# Patient Record
Sex: MALE
[De-identification: names, ages, dates, MRNs, and addresses within clinical notes are randomized per-mention and may not be internally consistent; named-entity substitution may affect disease eponyms.]

---

## 2018-05-18 ENCOUNTER — HOSPITAL ENCOUNTER (OUTPATIENT)
Dept: HOSPITAL 88 - CT | Age: 53
End: 2018-05-18
Attending: UROLOGY
Payer: COMMERCIAL

## 2018-05-18 DIAGNOSIS — N20.0: Primary | ICD-10-CM

## 2018-05-18 PROCEDURE — 74176 CT ABD & PELVIS W/O CONTRAST: CPT

## 2018-05-18 NOTE — DIAGNOSTIC IMAGING REPORT
PROCEDURE: CT ABDOMEN AND PELVIS WITHOUT CONTRAST

 

TECHNIQUE: 

The abdomen and pelvis were scanned utilizing a multidetector helical 

scanner from the diaphragm to the lesser trochanter after the oral 

administration of water. No IV contrast was administered per protocol. 

Coronal and sagittal multiplanar reformations were obtained.

 

COMPARISON:   Patients [facility], CT, CT ABDOMEN/PELVIS WO, 

12/12/2011, 16:29.  Patients [facility], CT, CT ABDOMEN/PELVIS W, 

10/27/2014, 17:50.

 

INDICATIONS:   CALCULUS OF KIDNEY

 

FINDINGS:

 

ABSENCE OF INTRAVENOUS CONTRAST DECREASES SENSITIVITY FOR DETECTION OF 

FOCAL LESIONS AND VASCULAR PATHOLOGY.

 

LOWER THORAX: Lung bases are grossly clear. Atherosclerotic 

calcification of the coronary arteries.

 

HEPATOBILIARY: Marked diffuse hepatic steatosis. No focal lesions. No 

biliary ductal dilation. The gallbladder is unremarkable.

SPLEEN: No splenomegaly.

PANCREAS: No focal masses or ductal dilatation.

 

ADRENALS: No adrenal nodules.

KIDNEYS/URETERS: No renal, ureteral, or bladder calculi. No 

hydronephrosis or obstruction. No renal contour abnormalities or 

significant perinephric stranding.

PELVIC ORGANS/BLADDER: Bladder and prostate are unremarkable.

 

PERITONEUM / RETROPERITONEUM: No free air or fluid.

LYMPH NODES: Mildly enlarged right external iliac lymph node (series 3, 

image 150), which measures 1.1 cm in short axis, decreased from 

previous.

Mildly enlarged left external iliac lymph node which measures 1.1 cm in 

short axis (series 3, image 145), stable.

No other adenopathy. 

VESSELS: IVC filter in place.

 Extensive venous collaterals are again seen in the abdominal wall.

GI TRACT: No bowel dilation or evidence of obstruction. No pericolonic 

inflammatory changes.

Marked degenerative disc changes L5-S1 with grade 1 anterolisthesis of 

L5 on S1 secondary to bilateral pars interarticularis defect. High 

density material in the right lateral aspect of L5 likely reflects 

vertebroplasty.

 

IMPRESSION:

 

1. no renal, ureteral, or bladder calculi. No hydronephrosis or 

obstruction.

2. Marked diffuse hepatic steatosis. No focal lesions.

3. Mildly enlarged right external iliac lymph node, which is decreased 

since prior exam. Mildly enlarged left external iliac lymph node, which 

is stable. No other adenopathy.

 

 

 

Santos Milligan M.D.  

Dictated by:  Santos Milligan M.D. on 5/18/2018 at 14:53     

Electronically approved by:  Santos Milligan M.D. on 5/18/2018 at 

14:53

## 2019-06-11 ENCOUNTER — HOSPITAL ENCOUNTER (OUTPATIENT)
Dept: HOSPITAL 88 - RAD | Age: 54
End: 2019-06-11
Attending: UROLOGY
Payer: COMMERCIAL

## 2019-06-11 DIAGNOSIS — Z87.442: ICD-10-CM

## 2019-06-11 DIAGNOSIS — M25.561: ICD-10-CM

## 2019-06-11 DIAGNOSIS — M25.562: Primary | ICD-10-CM

## 2019-06-11 PROCEDURE — 74018 RADEX ABDOMEN 1 VIEW: CPT

## 2019-06-11 NOTE — DIAGNOSTIC IMAGING REPORT
Exam: KUB - 3 views



Clinical History: Renal calculus.



Comparison: CT abdomen/pelvis 5/18/2018.



Findings: 

Bowel gas partially obscures visualization of the kidneys. No evidence of

nephrolithiasis. 



Calcification overlying the right L5 vertebral body corresponds to vertebral

augmentation changes. No acute osseous abnormality.



There is an IVC filter overlying the L3 vertebral body.



Impression:

Bowel gas partially obscures visualization of the kidneys. No evidence of

nephrolithiasis. 



Signed by: Dr. Ananya Orellana MD on 6/11/2019 3:27 PM

## 2019-06-11 NOTE — DIAGNOSTIC IMAGING REPORT
Exam: Right knee radiographs-3 views; left knee radiographs-3 views



History: Bilateral knee pain.



Comparison: None.



Findings:  

No evidence of acute fracture, malalignment, or soft tissue mildly. No

suprapatellar joint effusion. Minimal bilateral medial compartment degenerative

changes



Impression:

No acute radiographic abnormality. Minimal bilateral knee medial compartment

degenerative changes.



Signed by: Dr. Ananya Orellana MD on 6/11/2019 12:59 PM

## 2019-08-02 ENCOUNTER — HOSPITAL ENCOUNTER (OUTPATIENT)
Dept: HOSPITAL 88 - US | Age: 54
End: 2019-08-02
Attending: UROLOGY
Payer: COMMERCIAL

## 2019-08-02 DIAGNOSIS — N50.811: ICD-10-CM

## 2019-08-02 DIAGNOSIS — N43.3: ICD-10-CM

## 2019-08-02 DIAGNOSIS — C62.90: Primary | ICD-10-CM

## 2019-08-02 PROCEDURE — 76870 US EXAM SCROTUM: CPT

## 2019-08-02 PROCEDURE — 93976 VASCULAR STUDY: CPT

## 2019-08-02 NOTE — DIAGNOSTIC IMAGING REPORT
Exam: Testicular ultrasound.



Clinical History:  Right testicular pain



Technique: Grayscale and color Doppler and waveform evaluation of the testes.



Findings: 



Status post left orchiectomy.



The right testicle measures 3.1 x 1.4 x 2.2 cm and demonstrates slightly

heterogeneous echogenicity. Normal blood flow and waveform. 1 mm and 2 mm

punctate calcifications in the superior testicle. The right epididymis measures

1.0 x 0.8 x 0.8 cm and appears normal. Trace right hydrocele. No varicocele.



Bilateral nonenlarged inguinal lymph nodes.



Impression:

Status post left orchiectomy.



Slightly heterogeneous echogenicity of right testicle with normal blood flow.

Trace right hydrocele.



Signed by: Brian Payne MD on 8/2/2019 5:32 PM

## 2019-08-23 ENCOUNTER — HOSPITAL ENCOUNTER (INPATIENT)
Dept: HOSPITAL 88 - ER | Age: 54
LOS: 5 days | Discharge: HOME | DRG: 813 | End: 2019-08-28
Attending: INTERNAL MEDICINE | Admitting: INTERNAL MEDICINE
Payer: COMMERCIAL

## 2019-08-23 VITALS — SYSTOLIC BLOOD PRESSURE: 151 MMHG | DIASTOLIC BLOOD PRESSURE: 79 MMHG

## 2019-08-23 VITALS — DIASTOLIC BLOOD PRESSURE: 79 MMHG | SYSTOLIC BLOOD PRESSURE: 151 MMHG

## 2019-08-23 VITALS — WEIGHT: 315 LBS | HEIGHT: 70 IN | BODY MASS INDEX: 45.1 KG/M2

## 2019-08-23 DIAGNOSIS — R65.10: ICD-10-CM

## 2019-08-23 DIAGNOSIS — G47.33: ICD-10-CM

## 2019-08-23 DIAGNOSIS — Z86.718: ICD-10-CM

## 2019-08-23 DIAGNOSIS — E66.01: ICD-10-CM

## 2019-08-23 DIAGNOSIS — Z95.828: ICD-10-CM

## 2019-08-23 DIAGNOSIS — D68.9: Primary | ICD-10-CM

## 2019-08-23 DIAGNOSIS — Z85.47: ICD-10-CM

## 2019-08-23 DIAGNOSIS — R77.1: ICD-10-CM

## 2019-08-23 DIAGNOSIS — N40.0: ICD-10-CM

## 2019-08-23 DIAGNOSIS — E11.9: ICD-10-CM

## 2019-08-23 DIAGNOSIS — Z79.01: ICD-10-CM

## 2019-08-23 DIAGNOSIS — E88.09: ICD-10-CM

## 2019-08-23 DIAGNOSIS — M06.9: ICD-10-CM

## 2019-08-23 LAB
ALBUMIN SERPL-MCNC: 3.7 G/DL (ref 3.5–5)
ALBUMIN/GLOB SERPL: 0.9 {RATIO} (ref 0.8–2)
ALP SERPL-CCNC: 73 IU/L (ref 40–150)
ALT SERPL-CCNC: 49 IU/L (ref 0–55)
ANION GAP SERPL CALC-SCNC: 18.1 MMOL/L (ref 8–16)
BACTERIA URNS QL MICRO: (no result) /HPF
BASOPHILS # BLD AUTO: 0 10*3/UL (ref 0–0.1)
BASOPHILS NFR BLD AUTO: 0.2 % (ref 0–1)
BILIRUB UR QL: NEGATIVE
BUN SERPL-MCNC: 20 MG/DL (ref 7–26)
BUN/CREAT SERPL: 21 (ref 6–25)
CALCIUM SERPL-MCNC: 10 MG/DL (ref 8.4–10.2)
CHLORIDE SERPL-SCNC: 100 MMOL/L (ref 98–107)
CK MB SERPL-MCNC: 0.8 NG/ML (ref 0–5)
CK MB SERPL-MCNC: 1.1 NG/ML (ref 0–5)
CK SERPL-CCNC: 60 IU/L (ref 30–200)
CK SERPL-CCNC: 71 IU/L (ref 30–200)
CLARITY UR: CLEAR
CO2 SERPL-SCNC: 23 MMOL/L (ref 22–29)
COLOR UR: YELLOW
DEPRECATED APTT PLAS QN: 33.9 SECONDS (ref 23.8–35.5)
DEPRECATED INR PLAS: 1.83
DEPRECATED NEUTROPHILS # BLD AUTO: 14.5 10*3/UL (ref 2.1–6.9)
DEPRECATED RBC URNS MANUAL-ACNC: (no result) /HPF (ref 0–5)
EGFRCR SERPLBLD CKD-EPI 2021: > 60 ML/MIN (ref 60–?)
EOSINOPHIL # BLD AUTO: 0.1 10*3/UL (ref 0–0.4)
EOSINOPHIL NFR BLD AUTO: 0.6 % (ref 0–6)
EPI CELLS URNS QL MICRO: (no result) /LPF
ERYTHROCYTE [DISTWIDTH] IN CORD BLOOD: 15.3 % (ref 11.7–14.4)
GLOBULIN PLAS-MCNC: 4.3 G/DL (ref 2.3–3.5)
GLUCOSE SERPLBLD-MCNC: 185 MG/DL (ref 74–118)
HCT VFR BLD AUTO: 47.8 % (ref 38.2–49.6)
HGB BLD-MCNC: 15.9 G/DL (ref 14–18)
KETONES UR QL STRIP.AUTO: NEGATIVE
LEUKOCYTE ESTERASE UR QL STRIP.AUTO: NEGATIVE
LYMPHOCYTES # BLD: 0.9 10*3/UL (ref 1–3.2)
LYMPHOCYTES NFR BLD AUTO: 5.2 % (ref 18–39.1)
MCH RBC QN AUTO: 32.2 PG (ref 28–32)
MCHC RBC AUTO-ENTMCNC: 33.3 G/DL (ref 31–35)
MCV RBC AUTO: 96.8 FL (ref 81–99)
MONOCYTES # BLD AUTO: 1 10*3/UL (ref 0.2–0.8)
MONOCYTES NFR BLD AUTO: 5.9 % (ref 4.4–11.3)
NEUTS SEG NFR BLD AUTO: 87.5 % (ref 38.7–80)
NITRITE UR QL STRIP.AUTO: NEGATIVE
PLATELET # BLD AUTO: 201 X10E3/UL (ref 140–360)
POTASSIUM SERPL-SCNC: 4.1 MMOL/L (ref 3.5–5.1)
PROT UR QL STRIP.AUTO: NEGATIVE
PROTHROMBIN TIME: 21.8 SECONDS (ref 11.9–14.5)
RBC # BLD AUTO: 4.94 X10E6/UL (ref 4.3–5.7)
SODIUM SERPL-SCNC: 137 MMOL/L (ref 136–145)
SP GR UR STRIP: 1.01 (ref 1.01–1.02)
UROBILINOGEN UR STRIP-MCNC: 0.2 MG/DL (ref 0.2–1)
WBC #/AREA URNS HPF: (no result) /HPF (ref 0–5)

## 2019-08-23 PROCEDURE — 83036 HEMOGLOBIN GLYCOSYLATED A1C: CPT

## 2019-08-23 PROCEDURE — 80048 BASIC METABOLIC PNL TOTAL CA: CPT

## 2019-08-23 PROCEDURE — 83605 ASSAY OF LACTIC ACID: CPT

## 2019-08-23 PROCEDURE — 87086 URINE CULTURE/COLONY COUNT: CPT

## 2019-08-23 PROCEDURE — 71045 X-RAY EXAM CHEST 1 VIEW: CPT

## 2019-08-23 PROCEDURE — 85610 PROTHROMBIN TIME: CPT

## 2019-08-23 PROCEDURE — 85025 COMPLETE CBC W/AUTO DIFF WBC: CPT

## 2019-08-23 PROCEDURE — 84439 ASSAY OF FREE THYROXINE: CPT

## 2019-08-23 PROCEDURE — 93306 TTE W/DOPPLER COMPLETE: CPT

## 2019-08-23 PROCEDURE — 82948 REAGENT STRIP/BLOOD GLUCOSE: CPT

## 2019-08-23 PROCEDURE — 85379 FIBRIN DEGRADATION QUANT: CPT

## 2019-08-23 PROCEDURE — 93970 EXTREMITY STUDY: CPT

## 2019-08-23 PROCEDURE — 83880 ASSAY OF NATRIURETIC PEPTIDE: CPT

## 2019-08-23 PROCEDURE — 80061 LIPID PANEL: CPT

## 2019-08-23 PROCEDURE — 83735 ASSAY OF MAGNESIUM: CPT

## 2019-08-23 PROCEDURE — 84443 ASSAY THYROID STIM HORMONE: CPT

## 2019-08-23 PROCEDURE — 87040 BLOOD CULTURE FOR BACTERIA: CPT

## 2019-08-23 PROCEDURE — 87070 CULTURE OTHR SPECIMN AEROBIC: CPT

## 2019-08-23 PROCEDURE — 82553 CREATINE MB FRACTION: CPT

## 2019-08-23 PROCEDURE — 80053 COMPREHEN METABOLIC PANEL: CPT

## 2019-08-23 PROCEDURE — 36415 COLL VENOUS BLD VENIPUNCTURE: CPT

## 2019-08-23 PROCEDURE — 82550 ASSAY OF CK (CPK): CPT

## 2019-08-23 PROCEDURE — 81001 URINALYSIS AUTO W/SCOPE: CPT

## 2019-08-23 PROCEDURE — 93005 ELECTROCARDIOGRAM TRACING: CPT

## 2019-08-23 PROCEDURE — 99284 EMERGENCY DEPT VISIT MOD MDM: CPT

## 2019-08-23 PROCEDURE — 85730 THROMBOPLASTIN TIME PARTIAL: CPT

## 2019-08-23 PROCEDURE — 86140 C-REACTIVE PROTEIN: CPT

## 2019-08-23 PROCEDURE — 85651 RBC SED RATE NONAUTOMATED: CPT

## 2019-08-23 PROCEDURE — 84484 ASSAY OF TROPONIN QUANT: CPT

## 2019-08-23 PROCEDURE — 83518 IMMUNOASSAY DIPSTICK: CPT

## 2019-08-23 PROCEDURE — 94660 CPAP INITIATION&MGMT: CPT

## 2019-08-23 RX ADMIN — Medication PRN MG: at 21:31

## 2019-08-23 RX ADMIN — ENOXAPARIN SODIUM SCH MG: 100 INJECTION SUBCUTANEOUS at 18:24

## 2019-08-23 RX ADMIN — SODIUM CHLORIDE SCH MLS/HR: 9 INJECTION, SOLUTION INTRAVENOUS at 18:24

## 2019-08-23 NOTE — XMS REPORT
Patient Summary Document

                             Created on: 2019



CHEMA FISHER

External Reference #: [id number]

: 1965

Sex: Male



Demographics







                          Address                   [address] [place] [address]  [address]

 

                          Home Phone                [phone]

 

                          Preferred Language        Unknown

 

                          Marital Status            Unknown

 

                          Sabianist Affiliation     Unknown

 

                          Race                      Unknown

 

                                        Additional Race(s)  

 

                          Ethnic Group              Unknown





Author







                          [facility]                    [facility]

 

                          [facility]

 

                          Address                   Unknown

 

                          Phone                     Unavailable







Care Team Providers







                    Care Team Member Name    Role                Phone

 

                    CHARLES WILHELM        Unavailable         Unavailable







Problems

This patient has no known problems.



Allergies, Adverse Reactions, Alerts

This patient has no known allergies or adverse reactions.



Medications

This patient has no known medications.



Results







           Test Description    Test Time    Test Comments    Text Results    Atomic Results    Result

 Comments

 

                 TESTICULAR    2019 17:28:00                                                             

                                             [place] [address]  
   Patient Name: CHEMA FISHER                                   MR #: 
[id number]                     : 1965                                  
Age/Sex: 54/M  Acct #: [id number]                              Req #: [id number]
[id number]  [facility] Physician:                                                      
Ordered by: CHARLES WILHELM MD                            Report #: [id number]       
Location:                                       Room/Bed:                     
___________________________________________________________________________________________________
   Procedure: [id number] US/US TESTICULAR  Exam Date: 19                   
        Exam Time: 1638                                              REPORT 
STATUS: Signed    Exam: Testicular ultrasound.      Clinical History:  Right 
testicular pain      Technique: Grayscale and color Doppler and waveform 
evaluation of the testes.      Findings:       Status post left orchiectomy.    
 The right testicle measures 3.1 x 1.4 x 2.2 cm and demonstrates slightly   
heterogeneous echogenicity. Normal blood flow and waveform. 1 mm and 2 mm   
punctate calcifications in the superior testicle. The right epididymis measures 
 1.0 x 0.8 x 0.8 cm and appears normal. Trace right hydrocele. No varicocele.   
  Bilateral nonenlarged inguinal lymph nodes.      Impression:   Status post 
left orchiectomy.      Slightly heterogeneous echogenicity of right testicle 
with normal blood flow.   Trace right hydrocele.      Signed by: Natty Infante MD 
on 2019 5:32 PM        Dictated By: NATTY INFANTE MD  Electronically Signed By:
NATTY INFANTE MD on 19  Transcribed By: MARTIR on 19       
COPY TO:   CHARLES WILHELM MD                 

 

                US TESTICULAR DOPPLER LTD    2019 17:28:00                                                 

                                                         [place] [address]      Patient Name: CHEMA FISHER                        
          MR #: [id number]                     : 1965                  
                Age/Sex: 54/M  Acct #: [id number]                             
Req #: [id number]  Adm Physician:                                               
      Ordered by: CHARLES WILHELM MD                            Report #: [id number] 
      Location:                                       Room/Bed:               
     
___________________________________________________________________________________________________
   Procedure: [id number] US/US TESTICULAR DOPPLER LTD  Exam Date: 19       
                    Exam Time: 1638                                             
REPORT STATUS: Signed    Exam: Testicular ultrasound.      Clinical History:  
Right testicular pain      Technique: Grayscale and color Doppler and waveform 
evaluation of the testes.      Findings:       Status post left orchiectomy.    
 The right testicle measures 3.1 x 1.4 x 2.2 cm and demonstrates slightly   
heterogeneous echogenicity. Normal blood flow and waveform. 1 mm and 2 mm   
punctate calcifications in the superior testicle. The right epididymis measures 
 1.0 x 0.8 x 0.8 cm and appears normal. Trace right hydrocele. No varicocele.   
  Bilateral nonenlarged inguinal lymph nodes.      Impression:   Status post 
left orchiectomy.      Slightly heterogeneous echogenicity of right testicle 
with normal blood flow.   Trace right hydrocele.      Signed by: Natty Infante MD 
on 2019 5:32 PM        Dictated By: NATTY INFANTE MD  Electronically Signed By:
NATTY INFANTE MD on 19  Transcribed By: MARTIR on 19       
COPY TO:   CHARLES WILHELM MD                 

 

                ABDOMEN-1VIEW (KUB)    2019 15:17:00                                                       

                                                   [place] [address]      Patient Name: CHEMA FISHER                                  
MR #: [id number]                     : 1965                            
      Age/Sex: 54/M  Acct #: [id number]                              Req #: 
[id number]  Adm Physician:                                                      
Ordered by: CHARLES WILHELM MD                            Report #: [id number]       
Location: [facility]                                     Room/Bed:                     
___________________________________________________________________________________________________
   Procedure: [id number] DX/ABDOMEN-1VIEW (KUB)  Exam Date: 19             
              Exam Time: 1458                                              
REPORT STATUS: Signed    Exam: KUB - 3 views      Clinical History: Renal 
calculus.      Comparison: CT abdomen/pelvis 2018.      Findings:    Bowel 
gas partially obscures visualization of the kidneys. No evidence of   
nephrolithiasis.       Calcification overlying the right L5 vertebral body 
corresponds to vertebral   augmentation changes. No acute osseous abnormality.  
   There is an IVC filter overlying the L3 vertebral body.      Impression:   
Bowel gas partially obscures visualization of the kidneys. No evidence of   
nephrolithiasis.       Signed by: Dr. Dao Maldonado MD on 2019 3:27 PM       
Dictated By: DAO MALDONADO MD  Electronically Signed By: DAO MALDONADO MD on 19 
1527  Transcribed By: MARTIR on 19 152       COPY TO:   CHARLES WILHELM MD   
                                         

 

                KNEE THREE VIEWS BILATERAL    2019 12:56:00                                                

                                                          [place] [address] [place], [place] [address]      Patient Name: CHEMA FISHER                        
          MR #: [id number]                     : 1965                  
                Age/Sex: 54/M  Acct #: [id number]                             
Req #: [id number]  Adm Physician:                                               
      Ordered by: OLIVIA HAINES                             Report #: [id number]
[id number]        Location: RAD                                     Room/Bed:         
           
___________________________________________________________________________________________________
   Procedure: [id number] DX/KNEE THREE VIEWS BILATERAL  Exam Date: 19      
                     Exam Time: 1228                                            
 REPORT STATUS: Signed       Exam: Right knee radiographs-3 views; left knee 
radiographs-3 views      History: Bilateral knee pain.      Comparison: None.   
  Findings:     No evidence of acute fracture, malalignment, or soft tissue 
mildly. No   suprapatellar joint effusion. Minimal bilateral medial compartment 
degenerative   changes      Impression:   No acute radiographic abnormality. 
Minimal bilateral knee medial compartment   degenerative changes.      Signed 
by: Dr. Dao Maldonado MD on 2019 12:59 PM        Dictated By: DAO MALDONADO MD  
Electronically Signed By: DAO MALDONADO MD on 19 1259  Transcribed By: 
MARTIR on 19 1259       COPY TO:   OLIVIA HAINES (NON STAFF)              

 

                CT ABDOMEN/PELVIS WO                                        [address] [address]      Patient Name: CHEMA FISHER 
 MR #: [id number]    : 1965 Age/Sex: 53/M  Acct #: [id number] Req #: 
[id number]  Adm Physician:     Ordered by: CHARLES WILHELM MD  Report #: [id number]  
Location: CT  Room/Bed:     
_______________________________________________________________________________
____________________    Procedure: [id number] CT/CT ABDOMEN/PELVIS WO  Exam Date:
18                            Exam Time: 1231       REPORT STATUS: Signed 
  PROCEDURE: CT ABDOMEN AND PELVIS WITHOUT CONTRAST       TECHNIQUE:    The 
abdomen and pelvis were scanned utilizing a multidetector helical    scanner 
from the diaphragm to the lesser trochanter after the oral    administration of 
water. No IV contrast was administered per protocol.    Coronal and sagittal 
multiplanar reformations were obtained.       COMPARISON:   [facility], CT, CT ABDOMEN/PELVIS WO,    2011, 16:29.  [facility], CT, CT ABDOMEN/PELVIS W,    10/27/2014, 17:50.       INDICATIONS:   
CALCULUS OF KIDNEY       FINDINGS:       ABSENCE OF INTRAVENOUS CONTRAST 
DECREASES SENSITIVITY FOR DETECTION OF    FOCAL LESIONS AND VASCULAR PATHOLOGY. 
     LOWER THORAX: Lung bases are grossly clear. Atherosclerotic    
calcification of the coronary arteries.       HEPATOBILIARY: Marked diffuse 
hepatic steatosis. No focal lesions. No    biliary ductal dilation. The 
gallbladder is unremarkable.   SPLEEN: No splenomegaly.   PANCREAS: No focal 
masses or ductal dilatation.       ADRENALS: No adrenal nodules.   K
IDNEYS/URETERS: No renal, ureteral, or bladder calculi. No    hydronephrosis or 
obstruction. No renal contour abnormalities or    significant perinephric 
stranding.   PELVIC ORGANS/BLADDER: Bladder and prostate are unremarkable.      
PERITONEUM / RETROPERITONEUM: No free air or fluid.   LYMPH NODES: Mildly 
enlarged right external iliac lymph node (series 3,    image 150), which 
measures 1.1 cm in short axis, decreased from    previous.   Mildly enlarged 
left external iliac lymph node which measures 1.1 cm in    short axis (series 3,
image 145), stable.   No other adenopathy.    VESSELS: IVC filter in place.    
Extensive venous collaterals are again seen in the abdominal wall.   GI TRACT: 
No bowel dilation or evidence of obstruction. No pericolonic    inflammatory 
changes.   Marked degenerative disc changes L5-S1 with grade 1 anterolisthesis 
of    L5 on S1 secondary to bilateral pars interarticularis defect. High    
density material in the right lateral aspect of L5 likely reflects    vertebropl
asty.       IMPRESSION:       1. no renal, ureteral, or bladder calculi. No 
hydronephrosis or    obstruction.   2. Marked diffuse hepatic steatosis. No 
focal lesions.   3. Mildly enlarged right external iliac lymph node, which is 
decreased    since prior exam. Mildly enlarged left external iliac lymph node, 
which    is stable. No other adenopathy.               Guerrero Milligan M.D.  
  Dictated by:  Guerrero Milligan M.D. on 2018 at 14:53        
Electronically approved by:  Guerrero Milligan M.D. on 2018 at    14:53   
            Dictated By: GUERRERO MILLIGAN MD  Electronically Signed By: GUERRERO MILLIGAN MD on 18 1457  Transcribed By: JACKSON on 18 1457       COPY 
TO:   CHARLES WILHELM MD

## 2019-08-23 NOTE — DIAGNOSTIC IMAGING REPORT
Chest radiograph, single portable view



Clinical indication: Bodyaches, shaking



Comparison: No chest radiograph comparisons available for review.



Findings:

The heart is within normal limits in size. The mediastinal and hilar contours

are unremarkable. The mediastinal and hilar contours are unremarkable. No focal

consolidation, sizable pleural effusion, or pneumothorax. No acute osseous

abnormalities are identified.



Impression:

No radiographic evidence of acute cardiopulmonary process.



Signed by: Stephen E Dreyer, MD on 8/23/2019 3:46 PM

## 2019-08-24 VITALS — SYSTOLIC BLOOD PRESSURE: 133 MMHG | DIASTOLIC BLOOD PRESSURE: 67 MMHG

## 2019-08-24 VITALS — DIASTOLIC BLOOD PRESSURE: 83 MMHG | SYSTOLIC BLOOD PRESSURE: 152 MMHG

## 2019-08-24 VITALS — SYSTOLIC BLOOD PRESSURE: 143 MMHG | DIASTOLIC BLOOD PRESSURE: 73 MMHG

## 2019-08-24 VITALS — DIASTOLIC BLOOD PRESSURE: 65 MMHG | SYSTOLIC BLOOD PRESSURE: 138 MMHG

## 2019-08-24 VITALS — SYSTOLIC BLOOD PRESSURE: 127 MMHG | DIASTOLIC BLOOD PRESSURE: 70 MMHG

## 2019-08-24 VITALS — SYSTOLIC BLOOD PRESSURE: 121 MMHG | DIASTOLIC BLOOD PRESSURE: 75 MMHG

## 2019-08-24 VITALS — DIASTOLIC BLOOD PRESSURE: 67 MMHG | SYSTOLIC BLOOD PRESSURE: 133 MMHG

## 2019-08-24 LAB
ALBUMIN SERPL-MCNC: 2.8 G/DL (ref 3.5–5)
ALBUMIN/GLOB SERPL: 0.7 {RATIO} (ref 0.8–2)
ALP SERPL-CCNC: 60 IU/L (ref 40–150)
ALT SERPL-CCNC: 34 IU/L (ref 0–55)
ANION GAP SERPL CALC-SCNC: 14.8 MMOL/L (ref 8–16)
BASOPHILS # BLD AUTO: 0 10*3/UL (ref 0–0.1)
BASOPHILS NFR BLD AUTO: 0.3 % (ref 0–1)
BUN SERPL-MCNC: 15 MG/DL (ref 7–26)
BUN/CREAT SERPL: 19 (ref 6–25)
CALCIUM SERPL-MCNC: 9.4 MG/DL (ref 8.4–10.2)
CHLORIDE SERPL-SCNC: 102 MMOL/L (ref 98–107)
CHOLEST SERPL-MCNC: 146 MD/DL (ref 0–199)
CHOLEST/HDLC SERPL: 3.6 {RATIO} (ref 3.9–4.7)
CK MB SERPL-MCNC: 1.4 NG/ML (ref 0–5)
CK MB SERPL-MCNC: 1.9 NG/ML (ref 0–5)
CK SERPL-CCNC: 75 IU/L (ref 30–200)
CK SERPL-CCNC: 78 IU/L (ref 30–200)
CO2 SERPL-SCNC: 26 MMOL/L (ref 22–29)
DEPRECATED INR PLAS: 1.65
DEPRECATED INR PLAS: 1.77
DEPRECATED NEUTROPHILS # BLD AUTO: 9.1 10*3/UL (ref 2.1–6.9)
EGFRCR SERPLBLD CKD-EPI 2021: > 60 ML/MIN (ref 60–?)
EOSINOPHIL # BLD AUTO: 0 10*3/UL (ref 0–0.4)
EOSINOPHIL NFR BLD AUTO: 0.1 % (ref 0–6)
ERYTHROCYTE [DISTWIDTH] IN CORD BLOOD: 15.4 % (ref 11.7–14.4)
GLOBULIN PLAS-MCNC: 3.8 G/DL (ref 2.3–3.5)
GLUCOSE SERPLBLD-MCNC: 128 MG/DL (ref 74–118)
HCT VFR BLD AUTO: 42.4 % (ref 38.2–49.6)
HDLC SERPL-MSCNC: 41 MG/DL (ref 40–60)
HGB BLD-MCNC: 13.8 G/DL (ref 14–18)
LDLC SERPL CALC-MCNC: 80 MG/DL (ref 60–130)
LYMPHOCYTES # BLD: 1.3 10*3/UL (ref 1–3.2)
LYMPHOCYTES NFR BLD AUTO: 10.9 % (ref 18–39.1)
MCH RBC QN AUTO: 32.1 PG (ref 28–32)
MCHC RBC AUTO-ENTMCNC: 32.5 G/DL (ref 31–35)
MCV RBC AUTO: 98.6 FL (ref 81–99)
MONOCYTES # BLD AUTO: 1.2 10*3/UL (ref 0.2–0.8)
MONOCYTES NFR BLD AUTO: 9.9 % (ref 4.4–11.3)
NEUTS SEG NFR BLD AUTO: 78.5 % (ref 38.7–80)
PLATELET # BLD AUTO: 155 X10E3/UL (ref 140–360)
POTASSIUM SERPL-SCNC: 3.8 MMOL/L (ref 3.5–5.1)
PROTHROMBIN TIME: 20.1 SECONDS (ref 11.9–14.5)
PROTHROMBIN TIME: 21.3 SECONDS (ref 11.9–14.5)
RBC # BLD AUTO: 4.3 X10E6/UL (ref 4.3–5.7)
SODIUM SERPL-SCNC: 139 MMOL/L (ref 136–145)
T4 FREE SERPL-MCNC: 0.87 NG/DL (ref 0.8–1.8)
TRIGL SERPL-MCNC: 126 MG/DL (ref 0–149)
TSH SERPL DL<=0.005 MIU/L-ACNC: 0.88 UIU/ML (ref 0.35–4.94)

## 2019-08-24 RX ADMIN — SODIUM CHLORIDE SCH MLS/HR: 9 INJECTION, SOLUTION INTRAVENOUS at 17:45

## 2019-08-24 RX ADMIN — TAZOBACTAM SODIUM AND PIPERACILLIN SODIUM SCH MLS/HR: 375; 3 INJECTION, SOLUTION INTRAVENOUS at 17:45

## 2019-08-24 RX ADMIN — METFORMIN HYDROCHLORIDE SCH MG: 500 TABLET, FILM COATED ORAL at 17:44

## 2019-08-24 RX ADMIN — FAMOTIDINE SCH MG: 20 TABLET, FILM COATED ORAL at 08:15

## 2019-08-24 RX ADMIN — METHYLPREDNISOLONE SODIUM SUCCINATE SCH MG: 125 INJECTION, POWDER, FOR SOLUTION INTRAMUSCULAR; INTRAVENOUS at 19:59

## 2019-08-24 RX ADMIN — TAZOBACTAM SODIUM AND PIPERACILLIN SODIUM SCH MLS/HR: 375; 3 INJECTION, SOLUTION INTRAVENOUS at 13:07

## 2019-08-24 RX ADMIN — SODIUM CHLORIDE SCH MLS/HR: 9 INJECTION, SOLUTION INTRAVENOUS at 10:31

## 2019-08-24 RX ADMIN — INSULIN LISPRO SCH UNIT: 100 INJECTION, SOLUTION INTRAVENOUS; SUBCUTANEOUS at 16:30

## 2019-08-24 RX ADMIN — Medication PRN MG: at 19:59

## 2019-08-24 RX ADMIN — Medication PRN MG: at 01:44

## 2019-08-24 RX ADMIN — Medication PRN MG: at 23:54

## 2019-08-24 RX ADMIN — SODIUM CHLORIDE SCH MLS/HR: 9 INJECTION, SOLUTION INTRAVENOUS at 01:47

## 2019-08-24 RX ADMIN — Medication PRN MG: at 10:35

## 2019-08-24 RX ADMIN — ENOXAPARIN SODIUM SCH MG: 100 INJECTION SUBCUTANEOUS at 19:59

## 2019-08-24 RX ADMIN — FAMOTIDINE SCH MG: 20 TABLET, FILM COATED ORAL at 17:20

## 2019-08-24 RX ADMIN — ENOXAPARIN SODIUM SCH MG: 100 INJECTION SUBCUTANEOUS at 08:50

## 2019-08-24 RX ADMIN — SODIUM CHLORIDE SCH MLS/HR: 9 INJECTION, SOLUTION INTRAVENOUS at 23:31

## 2019-08-24 RX ADMIN — INSULIN LISPRO SCH UNIT: 100 INJECTION, SOLUTION INTRAVENOUS; SUBCUTANEOUS at 19:59

## 2019-08-24 RX ADMIN — Medication PRN MG: at 15:31

## 2019-08-24 RX ADMIN — METHYLPREDNISOLONE SODIUM SUCCINATE SCH MG: 125 INJECTION, POWDER, FOR SOLUTION INTRAMUSCULAR; INTRAVENOUS at 09:00

## 2019-08-24 RX ADMIN — Medication PRN MG: at 06:32

## 2019-08-24 RX ADMIN — INSULIN LISPRO SCH UNIT: 100 INJECTION, SOLUTION INTRAVENOUS; SUBCUTANEOUS at 11:30

## 2019-08-24 RX ADMIN — WARFARIN SODIUM SCH MG: 5 TABLET ORAL at 17:44

## 2019-08-24 RX ADMIN — TAZOBACTAM SODIUM AND PIPERACILLIN SODIUM SCH MLS/HR: 375; 3 INJECTION, SOLUTION INTRAVENOUS at 23:31

## 2019-08-24 NOTE — NUR
DR. GOTTI AT BEDSIDE. AS PER THE  COUMADIN DAILY DOSE AND CHECK PT/INR DAILY. INFORMED 
THE SAME TO [facility] AS PER THE .  .

## 2019-08-24 NOTE — NUR
BEDSIDE SHIFT REPORT RECEIVED FROM THE NIGHT SHIFT RN. PT DENIES NEEDS AT THIS TIME. PT 
FAMILY AT BEDSIDE.

## 2019-08-25 VITALS — DIASTOLIC BLOOD PRESSURE: 90 MMHG | SYSTOLIC BLOOD PRESSURE: 143 MMHG

## 2019-08-25 VITALS — SYSTOLIC BLOOD PRESSURE: 148 MMHG | DIASTOLIC BLOOD PRESSURE: 78 MMHG

## 2019-08-25 VITALS — SYSTOLIC BLOOD PRESSURE: 143 MMHG | DIASTOLIC BLOOD PRESSURE: 90 MMHG

## 2019-08-25 VITALS — SYSTOLIC BLOOD PRESSURE: 138 MMHG | DIASTOLIC BLOOD PRESSURE: 77 MMHG

## 2019-08-25 VITALS — DIASTOLIC BLOOD PRESSURE: 84 MMHG | SYSTOLIC BLOOD PRESSURE: 146 MMHG

## 2019-08-25 VITALS — DIASTOLIC BLOOD PRESSURE: 83 MMHG | SYSTOLIC BLOOD PRESSURE: 142 MMHG

## 2019-08-25 VITALS — SYSTOLIC BLOOD PRESSURE: 153 MMHG | DIASTOLIC BLOOD PRESSURE: 77 MMHG

## 2019-08-25 LAB
ANION GAP SERPL CALC-SCNC: 14.3 MMOL/L (ref 8–16)
BASOPHILS # BLD AUTO: 0 10*3/UL (ref 0–0.1)
BASOPHILS NFR BLD AUTO: 0.1 % (ref 0–1)
BUN SERPL-MCNC: 17 MG/DL (ref 7–26)
BUN/CREAT SERPL: 21 (ref 6–25)
CALCIUM SERPL-MCNC: 9.6 MG/DL (ref 8.4–10.2)
CHLORIDE SERPL-SCNC: 103 MMOL/L (ref 98–107)
CO2 SERPL-SCNC: 24 MMOL/L (ref 22–29)
DEPRECATED INR PLAS: 1.59
DEPRECATED NEUTROPHILS # BLD AUTO: 7.4 10*3/UL (ref 2.1–6.9)
EGFRCR SERPLBLD CKD-EPI 2021: > 60 ML/MIN (ref 60–?)
EOSINOPHIL # BLD AUTO: 0 10*3/UL (ref 0–0.4)
EOSINOPHIL NFR BLD AUTO: 0 % (ref 0–6)
ERYTHROCYTE [DISTWIDTH] IN CORD BLOOD: 14.6 % (ref 11.7–14.4)
GLUCOSE SERPLBLD-MCNC: 190 MG/DL (ref 74–118)
HCT VFR BLD AUTO: 41.9 % (ref 38.2–49.6)
HGB BLD-MCNC: 13.9 G/DL (ref 14–18)
LYMPHOCYTES # BLD: 0.7 10*3/UL (ref 1–3.2)
LYMPHOCYTES NFR BLD AUTO: 8.6 % (ref 18–39.1)
MCH RBC QN AUTO: 32.2 PG (ref 28–32)
MCHC RBC AUTO-ENTMCNC: 33.2 G/DL (ref 31–35)
MCV RBC AUTO: 97 FL (ref 81–99)
MONOCYTES # BLD AUTO: 0.2 10*3/UL (ref 0.2–0.8)
MONOCYTES NFR BLD AUTO: 2.3 % (ref 4.4–11.3)
NEUTS SEG NFR BLD AUTO: 88.6 % (ref 38.7–80)
PLATELET # BLD AUTO: 151 X10E3/UL (ref 140–360)
POTASSIUM SERPL-SCNC: 4.3 MMOL/L (ref 3.5–5.1)
PROTHROMBIN TIME: 19.6 SECONDS (ref 11.9–14.5)
RBC # BLD AUTO: 4.32 X10E6/UL (ref 4.3–5.7)
SODIUM SERPL-SCNC: 137 MMOL/L (ref 136–145)

## 2019-08-25 RX ADMIN — METHYLPREDNISOLONE SODIUM SUCCINATE SCH MG: 125 INJECTION, POWDER, FOR SOLUTION INTRAMUSCULAR; INTRAVENOUS at 20:38

## 2019-08-25 RX ADMIN — CANAGLIFLOZIN SCH MG: 300 TABLET, FILM COATED ORAL at 10:24

## 2019-08-25 RX ADMIN — INSULIN LISPRO SCH UNIT: 100 INJECTION, SOLUTION INTRAVENOUS; SUBCUTANEOUS at 15:58

## 2019-08-25 RX ADMIN — FAMOTIDINE SCH MG: 20 TABLET, FILM COATED ORAL at 07:41

## 2019-08-25 RX ADMIN — Medication PRN MG: at 06:42

## 2019-08-25 RX ADMIN — METHYLPREDNISOLONE SODIUM SUCCINATE SCH MG: 125 INJECTION, POWDER, FOR SOLUTION INTRAMUSCULAR; INTRAVENOUS at 08:00

## 2019-08-25 RX ADMIN — Medication PRN MG: at 15:38

## 2019-08-25 RX ADMIN — WARFARIN SODIUM SCH MG: 5 TABLET ORAL at 17:18

## 2019-08-25 RX ADMIN — TAZOBACTAM SODIUM AND PIPERACILLIN SODIUM SCH MLS/HR: 375; 3 INJECTION, SOLUTION INTRAVENOUS at 17:15

## 2019-08-25 RX ADMIN — METFORMIN HYDROCHLORIDE SCH MG: 500 TABLET, FILM COATED ORAL at 08:00

## 2019-08-25 RX ADMIN — INSULIN LISPRO SCH UNIT: 100 INJECTION, SOLUTION INTRAVENOUS; SUBCUTANEOUS at 11:30

## 2019-08-25 RX ADMIN — FAMOTIDINE SCH MG: 20 TABLET, FILM COATED ORAL at 17:14

## 2019-08-25 RX ADMIN — TAZOBACTAM SODIUM AND PIPERACILLIN SODIUM SCH MLS/HR: 375; 3 INJECTION, SOLUTION INTRAVENOUS at 05:58

## 2019-08-25 RX ADMIN — TAZOBACTAM SODIUM AND PIPERACILLIN SODIUM SCH MLS/HR: 375; 3 INJECTION, SOLUTION INTRAVENOUS at 11:06

## 2019-08-25 RX ADMIN — TAZOBACTAM SODIUM AND PIPERACILLIN SODIUM SCH MLS/HR: 375; 3 INJECTION, SOLUTION INTRAVENOUS at 23:52

## 2019-08-25 RX ADMIN — METFORMIN HYDROCHLORIDE SCH MG: 500 TABLET, FILM COATED ORAL at 17:14

## 2019-08-25 RX ADMIN — INSULIN LISPRO SCH UNIT: 100 INJECTION, SOLUTION INTRAVENOUS; SUBCUTANEOUS at 07:30

## 2019-08-25 RX ADMIN — Medication PRN MG: at 11:08

## 2019-08-25 RX ADMIN — ENOXAPARIN SODIUM SCH MG: 100 INJECTION SUBCUTANEOUS at 08:00

## 2019-08-25 RX ADMIN — ENOXAPARIN SODIUM SCH MG: 100 INJECTION SUBCUTANEOUS at 20:38

## 2019-08-25 RX ADMIN — TAMSULOSIN HYDROCHLORIDE SCH MG: 0.4 CAPSULE ORAL at 20:38

## 2019-08-25 RX ADMIN — INSULIN LISPRO SCH UNIT: 100 INJECTION, SOLUTION INTRAVENOUS; SUBCUTANEOUS at 21:00

## 2019-08-25 RX ADMIN — Medication PRN MG: at 20:38

## 2019-08-25 NOTE — NUR
Received patient from day nurse, patient is stable, denies any concerns at this time. safety 
and fall precautions maintained as per hospital protocol: bed in lowest position and locked, 
needed items beside bed and call bell closed to patient, patient instructed to use it to 
call nurses for any help needed, patient verbalized understanding, patient is currently 
stable will continue to monitor.

## 2019-08-26 VITALS — DIASTOLIC BLOOD PRESSURE: 96 MMHG | SYSTOLIC BLOOD PRESSURE: 171 MMHG

## 2019-08-26 VITALS — SYSTOLIC BLOOD PRESSURE: 154 MMHG | DIASTOLIC BLOOD PRESSURE: 83 MMHG

## 2019-08-26 VITALS — SYSTOLIC BLOOD PRESSURE: 140 MMHG | DIASTOLIC BLOOD PRESSURE: 76 MMHG

## 2019-08-26 VITALS — DIASTOLIC BLOOD PRESSURE: 84 MMHG | SYSTOLIC BLOOD PRESSURE: 154 MMHG

## 2019-08-26 VITALS — SYSTOLIC BLOOD PRESSURE: 162 MMHG | DIASTOLIC BLOOD PRESSURE: 81 MMHG

## 2019-08-26 VITALS — DIASTOLIC BLOOD PRESSURE: 90 MMHG | SYSTOLIC BLOOD PRESSURE: 174 MMHG

## 2019-08-26 VITALS — DIASTOLIC BLOOD PRESSURE: 83 MMHG | SYSTOLIC BLOOD PRESSURE: 154 MMHG

## 2019-08-26 LAB
ANION GAP SERPL CALC-SCNC: 12.6 MMOL/L (ref 8–16)
BASOPHILS # BLD AUTO: 0 10*3/UL (ref 0–0.1)
BASOPHILS NFR BLD AUTO: 0.1 % (ref 0–1)
BUN SERPL-MCNC: 24 MG/DL (ref 7–26)
BUN/CREAT SERPL: 24 (ref 6–25)
CALCIUM SERPL-MCNC: 9.5 MG/DL (ref 8.4–10.2)
CHLORIDE SERPL-SCNC: 102 MMOL/L (ref 98–107)
CO2 SERPL-SCNC: 25 MMOL/L (ref 22–29)
DEPRECATED INR PLAS: 1.77
DEPRECATED NEUTROPHILS # BLD AUTO: 7.9 10*3/UL (ref 2.1–6.9)
EGFRCR SERPLBLD CKD-EPI 2021: > 60 ML/MIN (ref 60–?)
EOSINOPHIL # BLD AUTO: 0 10*3/UL (ref 0–0.4)
EOSINOPHIL NFR BLD AUTO: 0 % (ref 0–6)
ERYTHROCYTE [DISTWIDTH] IN CORD BLOOD: 14.5 % (ref 11.7–14.4)
GLUCOSE SERPLBLD-MCNC: 216 MG/DL (ref 74–118)
HCT VFR BLD AUTO: 42.7 % (ref 38.2–49.6)
HGB BLD-MCNC: 13.9 G/DL (ref 14–18)
LYMPHOCYTES # BLD: 0.7 10*3/UL (ref 1–3.2)
LYMPHOCYTES NFR BLD AUTO: 8.3 % (ref 18–39.1)
MCH RBC QN AUTO: 31.9 PG (ref 28–32)
MCHC RBC AUTO-ENTMCNC: 32.6 G/DL (ref 31–35)
MCV RBC AUTO: 97.9 FL (ref 81–99)
MONOCYTES # BLD AUTO: 0.2 10*3/UL (ref 0.2–0.8)
MONOCYTES NFR BLD AUTO: 1.7 % (ref 4.4–11.3)
NEUTS SEG NFR BLD AUTO: 89.4 % (ref 38.7–80)
PLATELET # BLD AUTO: 180 X10E3/UL (ref 140–360)
POTASSIUM SERPL-SCNC: 4.6 MMOL/L (ref 3.5–5.1)
PROTHROMBIN TIME: 21.3 SECONDS (ref 11.9–14.5)
RBC # BLD AUTO: 4.36 X10E6/UL (ref 4.3–5.7)
SODIUM SERPL-SCNC: 135 MMOL/L (ref 136–145)

## 2019-08-26 RX ADMIN — Medication PRN MG: at 04:48

## 2019-08-26 RX ADMIN — CANAGLIFLOZIN SCH MG: 300 TABLET, FILM COATED ORAL at 08:35

## 2019-08-26 RX ADMIN — INSULIN LISPRO SCH UNIT: 100 INJECTION, SOLUTION INTRAVENOUS; SUBCUTANEOUS at 07:30

## 2019-08-26 RX ADMIN — METFORMIN HYDROCHLORIDE SCH MG: 500 TABLET, FILM COATED ORAL at 08:35

## 2019-08-26 RX ADMIN — FAMOTIDINE SCH MG: 20 TABLET, FILM COATED ORAL at 08:35

## 2019-08-26 RX ADMIN — ENOXAPARIN SODIUM SCH MG: 100 INJECTION SUBCUTANEOUS at 20:15

## 2019-08-26 RX ADMIN — METHYLPREDNISOLONE SODIUM SUCCINATE SCH MG: 125 INJECTION, POWDER, FOR SOLUTION INTRAMUSCULAR; INTRAVENOUS at 20:14

## 2019-08-26 RX ADMIN — Medication PRN MG: at 00:39

## 2019-08-26 RX ADMIN — TAZOBACTAM SODIUM AND PIPERACILLIN SODIUM SCH MLS/HR: 375; 3 INJECTION, SOLUTION INTRAVENOUS at 13:50

## 2019-08-26 RX ADMIN — TAZOBACTAM SODIUM AND PIPERACILLIN SODIUM SCH MLS/HR: 375; 3 INJECTION, SOLUTION INTRAVENOUS at 06:12

## 2019-08-26 RX ADMIN — METHYLPREDNISOLONE SODIUM SUCCINATE SCH MG: 125 INJECTION, POWDER, FOR SOLUTION INTRAMUSCULAR; INTRAVENOUS at 08:35

## 2019-08-26 RX ADMIN — Medication PRN MG: at 15:44

## 2019-08-26 RX ADMIN — METFORMIN HYDROCHLORIDE SCH MG: 500 TABLET, FILM COATED ORAL at 16:45

## 2019-08-26 RX ADMIN — ENOXAPARIN SODIUM SCH MG: 100 INJECTION SUBCUTANEOUS at 08:35

## 2019-08-26 RX ADMIN — INSULIN LISPRO SCH UNIT: 100 INJECTION, SOLUTION INTRAVENOUS; SUBCUTANEOUS at 16:30

## 2019-08-26 RX ADMIN — INSULIN LISPRO SCH UNIT: 100 INJECTION, SOLUTION INTRAVENOUS; SUBCUTANEOUS at 11:30

## 2019-08-26 RX ADMIN — FAMOTIDINE SCH MG: 20 TABLET, FILM COATED ORAL at 16:44

## 2019-08-26 RX ADMIN — TAZOBACTAM SODIUM AND PIPERACILLIN SODIUM SCH MLS/HR: 375; 3 INJECTION, SOLUTION INTRAVENOUS at 17:52

## 2019-08-26 RX ADMIN — WARFARIN SODIUM SCH MG: 5 TABLET ORAL at 16:44

## 2019-08-26 RX ADMIN — INSULIN LISPRO SCH UNIT: 100 INJECTION, SOLUTION INTRAVENOUS; SUBCUTANEOUS at 20:15

## 2019-08-26 RX ADMIN — TAZOBACTAM SODIUM AND PIPERACILLIN SODIUM SCH MLS/HR: 375; 3 INJECTION, SOLUTION INTRAVENOUS at 23:37

## 2019-08-26 RX ADMIN — TAMSULOSIN HYDROCHLORIDE SCH MG: 0.4 CAPSULE ORAL at 20:14

## 2019-08-26 RX ADMIN — Medication PRN MG: at 23:37

## 2019-08-26 NOTE — NUR
Patient condition throughout the night was stable, patient endorsed to next shift for 
continuity of care.

## 2019-08-27 VITALS — DIASTOLIC BLOOD PRESSURE: 81 MMHG | SYSTOLIC BLOOD PRESSURE: 166 MMHG

## 2019-08-27 VITALS — SYSTOLIC BLOOD PRESSURE: 179 MMHG | DIASTOLIC BLOOD PRESSURE: 96 MMHG

## 2019-08-27 VITALS — SYSTOLIC BLOOD PRESSURE: 149 MMHG | DIASTOLIC BLOOD PRESSURE: 79 MMHG

## 2019-08-27 VITALS — SYSTOLIC BLOOD PRESSURE: 157 MMHG | DIASTOLIC BLOOD PRESSURE: 80 MMHG

## 2019-08-27 VITALS — DIASTOLIC BLOOD PRESSURE: 80 MMHG | SYSTOLIC BLOOD PRESSURE: 150 MMHG

## 2019-08-27 VITALS — SYSTOLIC BLOOD PRESSURE: 161 MMHG | DIASTOLIC BLOOD PRESSURE: 82 MMHG

## 2019-08-27 VITALS — SYSTOLIC BLOOD PRESSURE: 150 MMHG | DIASTOLIC BLOOD PRESSURE: 80 MMHG

## 2019-08-27 LAB
ANION GAP SERPL CALC-SCNC: 13.3 MMOL/L (ref 8–16)
BASOPHILS # BLD AUTO: 0 10*3/UL (ref 0–0.1)
BASOPHILS NFR BLD AUTO: 0.1 % (ref 0–1)
BUN SERPL-MCNC: 25 MG/DL (ref 7–26)
BUN/CREAT SERPL: 25 (ref 6–25)
CALCIUM SERPL-MCNC: 9.7 MG/DL (ref 8.4–10.2)
CHLORIDE SERPL-SCNC: 101 MMOL/L (ref 98–107)
CO2 SERPL-SCNC: 25 MMOL/L (ref 22–29)
DEPRECATED INR PLAS: 2.06
DEPRECATED NEUTROPHILS # BLD AUTO: 7 10*3/UL (ref 2.1–6.9)
EGFRCR SERPLBLD CKD-EPI 2021: > 60 ML/MIN (ref 60–?)
EOSINOPHIL # BLD AUTO: 0 10*3/UL (ref 0–0.4)
EOSINOPHIL NFR BLD AUTO: 0 % (ref 0–6)
ERYTHROCYTE [DISTWIDTH] IN CORD BLOOD: 14.6 % (ref 11.7–14.4)
GLUCOSE SERPLBLD-MCNC: 216 MG/DL (ref 74–118)
HCT VFR BLD AUTO: 42.8 % (ref 38.2–49.6)
HGB BLD-MCNC: 14.1 G/DL (ref 14–18)
LYMPHOCYTES # BLD: 0.7 10*3/UL (ref 1–3.2)
LYMPHOCYTES NFR BLD AUTO: 8.4 % (ref 18–39.1)
MCH RBC QN AUTO: 32 PG (ref 28–32)
MCHC RBC AUTO-ENTMCNC: 32.9 G/DL (ref 31–35)
MCV RBC AUTO: 97.3 FL (ref 81–99)
MONOCYTES # BLD AUTO: 0.2 10*3/UL (ref 0.2–0.8)
MONOCYTES NFR BLD AUTO: 2.8 % (ref 4.4–11.3)
NEUTS SEG NFR BLD AUTO: 87.8 % (ref 38.7–80)
PLATELET # BLD AUTO: 183 X10E3/UL (ref 140–360)
POTASSIUM SERPL-SCNC: 4.3 MMOL/L (ref 3.5–5.1)
PROTHROMBIN TIME: 23.9 SECONDS (ref 11.9–14.5)
RBC # BLD AUTO: 4.4 X10E6/UL (ref 4.3–5.7)
SODIUM SERPL-SCNC: 135 MMOL/L (ref 136–145)

## 2019-08-27 RX ADMIN — FAMOTIDINE SCH MG: 20 TABLET, FILM COATED ORAL at 09:08

## 2019-08-27 RX ADMIN — METFORMIN HYDROCHLORIDE SCH MG: 500 TABLET, FILM COATED ORAL at 09:08

## 2019-08-27 RX ADMIN — ENOXAPARIN SODIUM SCH MG: 100 INJECTION SUBCUTANEOUS at 09:08

## 2019-08-27 RX ADMIN — CANAGLIFLOZIN SCH MG: 300 TABLET, FILM COATED ORAL at 09:08

## 2019-08-27 RX ADMIN — Medication PRN MG: at 11:35

## 2019-08-27 RX ADMIN — INSULIN LISPRO SCH UNIT: 100 INJECTION, SOLUTION INTRAVENOUS; SUBCUTANEOUS at 20:41

## 2019-08-27 RX ADMIN — INSULIN LISPRO SCH UNIT: 100 INJECTION, SOLUTION INTRAVENOUS; SUBCUTANEOUS at 11:30

## 2019-08-27 RX ADMIN — TAMSULOSIN HYDROCHLORIDE SCH MG: 0.4 CAPSULE ORAL at 20:42

## 2019-08-27 RX ADMIN — TAZOBACTAM SODIUM AND PIPERACILLIN SODIUM SCH MLS/HR: 375; 3 INJECTION, SOLUTION INTRAVENOUS at 05:00

## 2019-08-27 RX ADMIN — METHYLPREDNISOLONE SODIUM SUCCINATE SCH MG: 125 INJECTION, POWDER, FOR SOLUTION INTRAMUSCULAR; INTRAVENOUS at 20:42

## 2019-08-27 RX ADMIN — TAZOBACTAM SODIUM AND PIPERACILLIN SODIUM SCH MLS/HR: 375; 3 INJECTION, SOLUTION INTRAVENOUS at 18:04

## 2019-08-27 RX ADMIN — METFORMIN HYDROCHLORIDE SCH MG: 500 TABLET, FILM COATED ORAL at 16:58

## 2019-08-27 RX ADMIN — WARFARIN SODIUM SCH MG: 5 TABLET ORAL at 16:58

## 2019-08-27 RX ADMIN — TAZOBACTAM SODIUM AND PIPERACILLIN SODIUM SCH MLS/HR: 375; 3 INJECTION, SOLUTION INTRAVENOUS at 12:27

## 2019-08-27 RX ADMIN — FAMOTIDINE SCH MG: 20 TABLET, FILM COATED ORAL at 16:57

## 2019-08-27 RX ADMIN — Medication PRN MG: at 20:42

## 2019-08-27 RX ADMIN — METHYLPREDNISOLONE SODIUM SUCCINATE SCH MG: 125 INJECTION, POWDER, FOR SOLUTION INTRAMUSCULAR; INTRAVENOUS at 09:08

## 2019-08-27 RX ADMIN — INSULIN LISPRO SCH UNIT: 100 INJECTION, SOLUTION INTRAVENOUS; SUBCUTANEOUS at 07:30

## 2019-08-27 RX ADMIN — INSULIN LISPRO SCH UNIT: 100 INJECTION, SOLUTION INTRAVENOUS; SUBCUTANEOUS at 16:30

## 2019-08-27 NOTE — NUR
Patient in bed. Family at bedside. Received pain meds as requested. Pain =5-6. Continue 
monitor for changes.

## 2019-08-27 NOTE — NUR
Blood drawn and sent to lab. Pressure dressing applied to site. Patient HR at 40-50 while 
sleeping. Denied CP or SOB.

## 2019-08-27 NOTE — NUR
Received bedside report from day shift RN. The patient is sitting up on the bed, not in 
distress. Call light within reach, bed height low, side rails up x2, and wheels lock.

## 2019-08-28 VITALS — DIASTOLIC BLOOD PRESSURE: 90 MMHG | SYSTOLIC BLOOD PRESSURE: 178 MMHG

## 2019-08-28 VITALS — DIASTOLIC BLOOD PRESSURE: 98 MMHG | SYSTOLIC BLOOD PRESSURE: 182 MMHG

## 2019-08-28 VITALS — SYSTOLIC BLOOD PRESSURE: 148 MMHG | DIASTOLIC BLOOD PRESSURE: 94 MMHG

## 2019-08-28 VITALS — DIASTOLIC BLOOD PRESSURE: 91 MMHG | SYSTOLIC BLOOD PRESSURE: 178 MMHG

## 2019-08-28 VITALS — SYSTOLIC BLOOD PRESSURE: 152 MMHG | DIASTOLIC BLOOD PRESSURE: 78 MMHG

## 2019-08-28 VITALS — SYSTOLIC BLOOD PRESSURE: 171 MMHG | DIASTOLIC BLOOD PRESSURE: 82 MMHG

## 2019-08-28 LAB
ANION GAP SERPL CALC-SCNC: 13.2 MMOL/L (ref 8–16)
BASOPHILS # BLD AUTO: 0 10*3/UL (ref 0–0.1)
BASOPHILS NFR BLD AUTO: 0.1 % (ref 0–1)
BUN SERPL-MCNC: 25 MG/DL (ref 7–26)
BUN/CREAT SERPL: 29 (ref 6–25)
CALCIUM SERPL-MCNC: 9.7 MG/DL (ref 8.4–10.2)
CHLORIDE SERPL-SCNC: 98 MMOL/L (ref 98–107)
CO2 SERPL-SCNC: 27 MMOL/L (ref 22–29)
DEPRECATED INR PLAS: 2.28
DEPRECATED NEUTROPHILS # BLD AUTO: 5.9 10*3/UL (ref 2.1–6.9)
EGFRCR SERPLBLD CKD-EPI 2021: > 60 ML/MIN (ref 60–?)
EOSINOPHIL # BLD AUTO: 0 10*3/UL (ref 0–0.4)
EOSINOPHIL NFR BLD AUTO: 0 % (ref 0–6)
ERYTHROCYTE [DISTWIDTH] IN CORD BLOOD: 14.4 % (ref 11.7–14.4)
GLUCOSE SERPLBLD-MCNC: 171 MG/DL (ref 74–118)
HCT VFR BLD AUTO: 43.6 % (ref 38.2–49.6)
HGB BLD-MCNC: 14.5 G/DL (ref 14–18)
LYMPHOCYTES # BLD: 0.8 10*3/UL (ref 1–3.2)
LYMPHOCYTES NFR BLD AUTO: 11.7 % (ref 18–39.1)
MCH RBC QN AUTO: 32.2 PG (ref 28–32)
MCHC RBC AUTO-ENTMCNC: 33.3 G/DL (ref 31–35)
MCV RBC AUTO: 96.7 FL (ref 81–99)
MONOCYTES # BLD AUTO: 0.2 10*3/UL (ref 0.2–0.8)
MONOCYTES NFR BLD AUTO: 3.3 % (ref 4.4–11.3)
NEUTS SEG NFR BLD AUTO: 83.6 % (ref 38.7–80)
PLATELET # BLD AUTO: 172 X10E3/UL (ref 140–360)
POTASSIUM SERPL-SCNC: 4.2 MMOL/L (ref 3.5–5.1)
PROTHROMBIN TIME: 25.8 SECONDS (ref 11.9–14.5)
RBC # BLD AUTO: 4.51 X10E6/UL (ref 4.3–5.7)
SODIUM SERPL-SCNC: 134 MMOL/L (ref 136–145)

## 2019-08-28 RX ADMIN — Medication PRN MG: at 08:35

## 2019-08-28 RX ADMIN — CANAGLIFLOZIN SCH MG: 300 TABLET, FILM COATED ORAL at 08:35

## 2019-08-28 RX ADMIN — METFORMIN HYDROCHLORIDE SCH MG: 500 TABLET, FILM COATED ORAL at 08:35

## 2019-08-28 RX ADMIN — TAZOBACTAM SODIUM AND PIPERACILLIN SODIUM SCH MLS/HR: 375; 3 INJECTION, SOLUTION INTRAVENOUS at 12:07

## 2019-08-28 RX ADMIN — TAZOBACTAM SODIUM AND PIPERACILLIN SODIUM SCH MLS/HR: 375; 3 INJECTION, SOLUTION INTRAVENOUS at 02:15

## 2019-08-28 RX ADMIN — FAMOTIDINE SCH MG: 20 TABLET, FILM COATED ORAL at 08:35

## 2019-08-28 RX ADMIN — INSULIN LISPRO SCH UNIT: 100 INJECTION, SOLUTION INTRAVENOUS; SUBCUTANEOUS at 11:30

## 2019-08-28 RX ADMIN — METHYLPREDNISOLONE SODIUM SUCCINATE SCH MG: 125 INJECTION, POWDER, FOR SOLUTION INTRAMUSCULAR; INTRAVENOUS at 08:35

## 2019-08-28 RX ADMIN — TAZOBACTAM SODIUM AND PIPERACILLIN SODIUM SCH MLS/HR: 375; 3 INJECTION, SOLUTION INTRAVENOUS at 06:42

## 2019-08-28 RX ADMIN — INSULIN LISPRO SCH UNIT: 100 INJECTION, SOLUTION INTRAVENOUS; SUBCUTANEOUS at 07:30

## 2019-08-28 NOTE — NUR
RECEIVED DC ORDER FROM MD. PATIENT IS IN STABLE CONDITION. IV LINE TO LEFT AC DCD WITH TIP 
INTACT, PRESSURE APPLIED TO SITE, NO BLEEDING NOTED. DISCHARGE TEACHING PROVIDED TO PATIENT, 
HE VERBALIZED UNDERSTANDING. DISCHARGE FOLDER ON HAND WHICH CONTAINS DC PAPERWORK AND 
PRESCRIPTIONS. PERSONAL ITEMS ON HAND. PATIENT ACCOMPANIED TO PRIVATE AUTO VIA WHEELCHAIR BY 
STAFF.

## 2019-08-28 NOTE — NUR
RECEIVED PATIENT RESTING IN BED. NO ACUTE DISTRESS NOTED. CALL LIGHT WITHIN REACH. BED IN 
THE LOWEST POSITION.

## 2019-08-28 NOTE — CONSULTATION
DATE OF CONSULTATION:  08/24/2019  

 

Consultation to Dr. Lujan.

 

HISTORY OF PRESENT ILLNESS:  Bassam Frias is a 54-year-old  male, who

is very well known to me for deep vein thrombosis, chronic venous congestion, IVC

filter, left testicular cancer.  The patient has disappeared from my office long time

back, has not had any followup. 

 

FAMILY HISTORY:  Noncontributory.

 

FAMILY HISTORY:  Noncontributory.

 

ALLERGIES:  REPORTED NONE.

 

MEDICATIONS:  At this time consist of:

1. Zosyn.

2. Sodium chloride.

3. Ondansetron.

4. Morphine.

5. Lovenox.

6. Tylenol.

7. Pepcid.

8. Methylprednisolone.

 

REVIEW OF SYSTEMS:

HEENT:  Normal. 

CARDIAC:  History of hyperlipidemia. 

RESPIRATORY:  History of pulmonary embolus, history of IVC filter. 

GI:  Normal. 

:  Left testicular cancer treated in the past. 

MUSCULOSKELETAL:  History of rheumatoid arthritis. 

NEUROENDOCRINE:  History of hyperglycemia at times.

 

PHYSICAL EXAMINATION:

GENERAL:  A large built male. 

NECK:  No adenopathy. 

HEART:  Within normal limits. 

LUNGS:  Clear. 

ABDOMEN:  Obese. 

RECTAL:  Deferred.  The patient does have left testes missing. 

CENTRAL NERVOUS SYSTEM:  Essentially normal.

LABORATORY DATA:  Lab shows a sodium of 139, potassium 3.8, chloride 102, CO2 26, BUN

15, creatinine 0.8, and glucose 128.  Hemoglobin 13.8, hematocrit 42.4, white count

11,600, and platelets 156,000.  INR 1.77.  Bilirubin 2, SGOT 21, SGPT 34, and alkaline

phosphatase 60. 

 

IMPRESSION:  

1. History of bilateral deep venous thrombosis.

2. History of rheumatoid arthritis.

3. History of diabetes mellitus.

4. History of hyperlipidemia.

5. Chronic venous congestion of both legs.

6. History of IVC filter.

7. History of left testicular cancer.

8. Morbid obesity.

9. Leukocytosis.

10. Hyperglobulinemia.

11. Hypoalbuminemia.

12. High D-dimer.

13. Under coagulated.

14. Urinary tract infection.

15. History of benign prostatic hyperplasia.

 

PLAN:  Plan is to increase the Coumadin, keep the INR to 3.5.  Lifetime anticoagulation,

as he does have IVC filter and had an underlying malignancy. 

 

Thank you very much for allowing me to participate in management of this patient.  I

will be more than happy to follow this patient as outpatient with the attending would

call and make an appointment. 

 

 

 

 

______________________________

MD LUKE Chairez/KADY

D:  08/28/2019 10:06:35

T:  08/28/2019 13:17:25

Job #:  [id number]